# Patient Record
Sex: MALE | Race: WHITE | Employment: FULL TIME
[De-identification: names, ages, dates, MRNs, and addresses within clinical notes are randomized per-mention and may not be internally consistent; named-entity substitution may affect disease eponyms.]

---

## 2022-09-22 ENCOUNTER — NURSE TRIAGE (OUTPATIENT)
Dept: OTHER | Facility: CLINIC | Age: 32
End: 2022-09-22

## 2022-09-22 ENCOUNTER — OFFICE VISIT (OUTPATIENT)
Dept: FAMILY MEDICINE CLINIC | Age: 32
End: 2022-09-22
Payer: COMMERCIAL

## 2022-09-22 VITALS
OXYGEN SATURATION: 97 % | HEIGHT: 69 IN | BODY MASS INDEX: 36.38 KG/M2 | HEART RATE: 106 BPM | RESPIRATION RATE: 20 BRPM | WEIGHT: 245.6 LBS | DIASTOLIC BLOOD PRESSURE: 112 MMHG | SYSTOLIC BLOOD PRESSURE: 158 MMHG | TEMPERATURE: 98.6 F

## 2022-09-22 DIAGNOSIS — R06.02 SOB (SHORTNESS OF BREATH): Primary | ICD-10-CM

## 2022-09-22 DIAGNOSIS — I10 PRIMARY HYPERTENSION: ICD-10-CM

## 2022-09-22 DIAGNOSIS — M25.561 CHRONIC PAIN OF RIGHT KNEE: ICD-10-CM

## 2022-09-22 DIAGNOSIS — Z86.711 HISTORY OF PULMONARY EMBOLISM: ICD-10-CM

## 2022-09-22 DIAGNOSIS — F11.91 HISTORY OF HEROIN USE: ICD-10-CM

## 2022-09-22 DIAGNOSIS — G89.29 CHRONIC PAIN OF RIGHT KNEE: ICD-10-CM

## 2022-09-22 PROBLEM — I26.99 PULMONARY EMBOLISM (HCC): Status: ACTIVE | Noted: 2018-01-25

## 2022-09-22 PROBLEM — I26.99 PULMONARY EMBOLISM (HCC): Status: RESOLVED | Noted: 2018-01-25 | Resolved: 2022-09-22

## 2022-09-22 PROCEDURE — 99204 OFFICE O/P NEW MOD 45 MIN: CPT | Performed by: FAMILY MEDICINE

## 2022-09-22 PROCEDURE — 93000 ELECTROCARDIOGRAM COMPLETE: CPT | Performed by: FAMILY MEDICINE

## 2022-09-22 RX ORDER — AMLODIPINE BESYLATE 5 MG/1
TABLET ORAL
COMMUNITY
Start: 2022-06-23

## 2022-09-22 RX ORDER — ALBUTEROL SULFATE 90 UG/1
2 AEROSOL, METERED RESPIRATORY (INHALATION) 4 TIMES DAILY PRN
Qty: 18 G | Refills: 0 | Status: SHIPPED | OUTPATIENT
Start: 2022-09-22 | End: 2022-10-18 | Stop reason: ALTCHOICE

## 2022-09-22 SDOH — ECONOMIC STABILITY: FOOD INSECURITY: WITHIN THE PAST 12 MONTHS, YOU WORRIED THAT YOUR FOOD WOULD RUN OUT BEFORE YOU GOT MONEY TO BUY MORE.: NEVER TRUE

## 2022-09-22 SDOH — ECONOMIC STABILITY: FOOD INSECURITY: WITHIN THE PAST 12 MONTHS, THE FOOD YOU BOUGHT JUST DIDN'T LAST AND YOU DIDN'T HAVE MONEY TO GET MORE.: NEVER TRUE

## 2022-09-22 SDOH — ECONOMIC STABILITY: TRANSPORTATION INSECURITY
IN THE PAST 12 MONTHS, HAS LACK OF TRANSPORTATION KEPT YOU FROM MEETINGS, WORK, OR FROM GETTING THINGS NEEDED FOR DAILY LIVING?: NO

## 2022-09-22 SDOH — ECONOMIC STABILITY: TRANSPORTATION INSECURITY
IN THE PAST 12 MONTHS, HAS THE LACK OF TRANSPORTATION KEPT YOU FROM MEDICAL APPOINTMENTS OR FROM GETTING MEDICATIONS?: NO

## 2022-09-22 ASSESSMENT — PATIENT HEALTH QUESTIONNAIRE - PHQ9
SUM OF ALL RESPONSES TO PHQ QUESTIONS 1-9: 0
3. TROUBLE FALLING OR STAYING ASLEEP: 0
SUM OF ALL RESPONSES TO PHQ9 QUESTIONS 1 & 2: 0
9. THOUGHTS THAT YOU WOULD BE BETTER OFF DEAD, OR OF HURTING YOURSELF: 0
2. FEELING DOWN, DEPRESSED OR HOPELESS: 0
7. TROUBLE CONCENTRATING ON THINGS, SUCH AS READING THE NEWSPAPER OR WATCHING TELEVISION: 0
1. LITTLE INTEREST OR PLEASURE IN DOING THINGS: 0
10. IF YOU CHECKED OFF ANY PROBLEMS, HOW DIFFICULT HAVE THESE PROBLEMS MADE IT FOR YOU TO DO YOUR WORK, TAKE CARE OF THINGS AT HOME, OR GET ALONG WITH OTHER PEOPLE: 0
4. FEELING TIRED OR HAVING LITTLE ENERGY: 0
SUM OF ALL RESPONSES TO PHQ QUESTIONS 1-9: 0
8. MOVING OR SPEAKING SO SLOWLY THAT OTHER PEOPLE COULD HAVE NOTICED. OR THE OPPOSITE, BEING SO FIGETY OR RESTLESS THAT YOU HAVE BEEN MOVING AROUND A LOT MORE THAN USUAL: 0
5. POOR APPETITE OR OVEREATING: 0
SUM OF ALL RESPONSES TO PHQ QUESTIONS 1-9: 0
SUM OF ALL RESPONSES TO PHQ QUESTIONS 1-9: 0
6. FEELING BAD ABOUT YOURSELF - OR THAT YOU ARE A FAILURE OR HAVE LET YOURSELF OR YOUR FAMILY DOWN: 0

## 2022-09-22 ASSESSMENT — SOCIAL DETERMINANTS OF HEALTH (SDOH): HOW HARD IS IT FOR YOU TO PAY FOR THE VERY BASICS LIKE FOOD, HOUSING, MEDICAL CARE, AND HEATING?: NOT HARD AT ALL

## 2022-09-22 NOTE — PROGRESS NOTES
Chief Complaint   Patient presents with    Established New Doctor    Shortness of Breath     Chest pain, SOB. Onset 6 months, but getting worse last month. Winded after walking up stairs    Knee Pain     Right knee pain       SUBJECTIVE:   Eugene Barrow is a 27 y.o. male presenting to establish care. HPI:   Shortness of breath x6mos that is getting worse. Can't walk up a flight of stairs without feeling like he's run a marathon  Bilateral PE 3yrs ago; unclear cause. This doesn't feel like that. He had pain with breathing at that time. Also had a leg blood clot and thinks he was told to be on blood thinners. Works with [employer] and is away from home during the week. Back at home on the weekends. Needs to be able to climb poles to get final certifications for work. He can't do this right now w/ the breathing difficulties    His paternal grandfather was on blood thinners. Doesn't know about his dad's medical history. Saw a blood specialist who recommended follow up and that he might need to be on blood thinners for life but he never followed up. Hx of heroin use, sober for 2 years. He has a girlfriend and he's gotten back to being able to see his kids. Hx of HTN. Never had heart problems; had an infection from IVDU but wasn't told it went to his heart; Took bp meds today. It's always high. He has same 90 day rx from 6/23. He has 30 pills left. He has hx of being up to 188/122 prior to meds. Rt knee- slipped on water and fell on his knee. 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 Driving is irritating. Doesn't give out or buckle on him. Hx of torn meniscus.         Patient Active Problem List   Diagnosis    ADHD (attention deficit hyperactivity disorder)    Chronic back pain    Anxiety    Poison ivy dermatitis    Sciatica of right side    Depression    Hematochezia    Bilious vomiting with nausea    History of heroin use    Chronic pain of right knee    History of pulmonary embolism    SOB (shortness of breath)     Past Medical History:   Diagnosis Date    Abscess of left arm     ADHD (attention deficit hyperactivity disorder)     dx 2009    Anxiety 8/3/2011    Bipolar disorder ([facility] 8/3/2011    Cellulitis     Cellulitis of left arm 8/23/2014    Chronic back pain     Colitis presumed infectious 10/6/2015    Depression 8/23/2014    Drug abuse ([facility]     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    Elbow dislocation     dislocation and fracture    Heroin abuse - using IV  8/23/2014    Heroin abuse - using IV  8/23/2014    MRSA infection 8/23/14    L ARM    Pulmonary embolism ([facility] 1/25/2018    Sciatica of right side 10/4/2012     Past Surgical History:   Procedure Laterality Date    ANKLE SURGERY      KNEE CARTILAGE SURGERY      left meniscus surgery 2003     Social History     Socioeconomic History    Marital status: Single     Spouse name: None    Number of children: None    Years of education: None    Highest education level: None   Tobacco Use    Smoking status: Light Smoker     Types: Cigarettes    Smokeless tobacco: Never   Substance and Sexual Activity    Alcohol use: Yes     Comment: social    Drug use: No    Sexual activity: Yes     Partners: Female     Social Determinants of Health     Financial Resource Strain: Low Risk     Difficulty of Paying Living Expenses: Not hard at all   Food Insecurity: No Food Insecurity    Worried About Running Out of Food in the Last Year: Never true    Ran Out of Food in the Last Year: Never true   Transportation Needs: No Transportation Needs    Lack of Transportation (Medical): No    Lack of Transportation (Non-Medical): No     Family History   Problem Relation Age of Onset    High Cholesterol Mother     Cancer Paternal Grandmother         breast cancer    Heart Failure Paternal Grandfather      Current Outpatient Medications   Medication Sig Dispense Refill    amLODIPine (NORVASC) 5 MG tablet TAKE 1 TABLET BY MOUTH EVERY DAY      albuterol sulfate HFA (VENTOLIN HFA) 108 (90 Base) MCG/ACT inhaler Inhale 2 puffs into the lungs 4 times daily as needed for Wheezing 18 g 0    QUEtiapine (SEROQUEL) 300 MG tablet Take 300 mg by mouth nightly        No current facility-administered medications for this visit. Patient has no known allergies. Health Maintenance   Topic Date Due    COVID-19 Vaccine (1) Never done    Varicella vaccine (1 of 2 - 2-dose childhood series) Never done    Pneumococcal 0-64 years Vaccine (1 - PCV) Never done    Flu vaccine (1) Never done    Depression Monitoring  09/22/2023    DTaP/Tdap/Td vaccine (2 - Td or Tdap) 01/07/2028    Hepatitis C screen  Completed    HIV screen  Completed    Hepatitis A vaccine  Aged Out    Hepatitis B vaccine  Aged Out    Hib vaccine  Aged Out    Meningococcal (ACWY) vaccine  Aged Out       Review of Systems:  General: No F/C/NS/fatigue/wt loss   Cardiovascular: No CP  Respiratory: No SOB  GI: No N/V/D/C/abd pain/blood in stool  Neuro: No HA/weakness  Psych: No depressed mood/anxiety  Musculoskeletal: No myalgias    OBJECTIVE:  BP (!) 158/112   Pulse (!) 106   Temp 98.6 °F (37 °C) (Oral)   Resp 20   Ht 5' 9\" (1.753 m)   Wt 245 lb 9.6 oz (111.4 kg)   SpO2 97%   BMI 36.27 kg/m²      Physical exam:  afebrile, vitals reviewed  Gen:  WD, WN, NAD, A&Ox3, pleasant  Eyes:  Sclerae clear  Neck:  Supple  Heart:  RRR, no murmur, rubs, gallops; pulse 86, pulse ox 98% on repeat  Lungs:  CTAB, no W/R/R  Abd:  soft, NT/ND  O:  afebrile, vitals reviewed  Gen:  WD, WN, NAD, A&Ox3, pleasant     Knee exam [x] Right [] Left: Inspection: No scars, abrasions, or discolorations of the skin overlying the knee. No visible swelling, effusions, or masses. Palpation: +ttp of medial meniscus and MCL. No tenderness to palpation over PES, patella, or Patellar facets. ROM: Full active ROM in flexion, extension at the knee. Full passive ROM.    Strength: 5/5 strength in flexion, extension at the knee  Neurovascular: 2/4 DP pulses bilaterally  Special Tests: Neg Apprehension, Neg patellar grind (Patellofemoral syndrome), Appropriate laxity on valgus stress (MCL), Appropriate laxity on Varus stress (LCL), Neg Lachman's (ACL), Neg Anterior Drawer (ACL), Neg Posterior Drawer (PCL), Neg Yen (posterior Menisci)       ASSESSMENT/PLAN:  1. SOB (shortness of breath)  New, uncontrolled. EKG wnl. STAT CT chest to eval for PE given hx of multiple VTE. MICHELE prn in case underlying pulm pathology. Will call w/ CT results  F/u next week for bp. Stage 2 HTN may  -     EKG 12 Lead  -     Basic Metabolic Panel; Future  -     albuterol sulfate HFA (VENTOLIN HFA) 108 (90 Base) MCG/ACT inhaler; Inhale 2 puffs into the lungs 4 times daily as needed for Wheezing, Disp-18 g, R-0Normal  -     CT CHEST PULMONARY EMBOLISM W CONTRAST; Future  2. History of heroin use  Est, stable. Encouraged continued sobriety. Will need HIV, HCV screening    3. History of pulmonary embolism  -     CT CHEST PULMONARY EMBOLISM W CONTRAST; Future    4. Chronic pain of right knee  New, uncontrolled. Possible meniscal tear  Recommend MRI Rt knee  -     MRI KNEE RIGHT WO CONTRAST; Future    5. Primary HTN  Chronic, uncontrolled. No chest pain. EKG without ST changes concerning for prior infarct. Will likely need dual therapy. Increase to norvasc 10mg daily for now. CT chest above. F/u next week      Return in about 1 week (around 9/29/2022) for blood pressure follow up. Electronically signed by Yahaira Graff MD on 9/22/2022 at 3:25 PM EDT    Please note, portions of this note were completed with a voice recognition program.  Although every effort was made to ensure the accuracy of this automated transcription, some errors in transcription may have occurred.

## 2022-09-23 ENCOUNTER — HOSPITAL ENCOUNTER (OUTPATIENT)
Dept: CT IMAGING | Age: 32
Discharge: HOME | End: 2022-09-23
Payer: COMMERCIAL

## 2022-09-23 DIAGNOSIS — Z86.711 HISTORY OF PULMONARY EMBOLISM: ICD-10-CM

## 2022-09-23 DIAGNOSIS — R06.02 SOB (SHORTNESS OF BREATH): ICD-10-CM

## 2022-09-23 PROCEDURE — 6360000004 HC RX CONTRAST MEDICATION: Performed by: FAMILY MEDICINE

## 2022-09-23 PROCEDURE — 71260 CT THORAX DX C+: CPT | Performed by: FAMILY MEDICINE

## 2022-09-23 RX ADMIN — IOPAMIDOL 75 ML: 755 INJECTION, SOLUTION INTRAVENOUS at 13:39

## 2022-09-26 PROBLEM — I10 PRIMARY HYPERTENSION: Status: ACTIVE | Noted: 2022-09-26

## 2022-09-27 ENCOUNTER — TELEMEDICINE (OUTPATIENT)
Dept: FAMILY MEDICINE CLINIC | Age: 32
End: 2022-09-27
Payer: COMMERCIAL

## 2022-09-27 DIAGNOSIS — R06.02 SOB (SHORTNESS OF BREATH): ICD-10-CM

## 2022-09-27 DIAGNOSIS — I10 PRIMARY HYPERTENSION: Primary | ICD-10-CM

## 2022-09-27 PROBLEM — K57.90 DIVERTICULOSIS: Status: ACTIVE | Noted: 2022-09-27

## 2022-09-27 PROBLEM — M50.30 DDD (DEGENERATIVE DISC DISEASE), CERVICAL: Status: ACTIVE | Noted: 2022-09-27

## 2022-09-27 PROBLEM — G43.909 MIGRAINE HEADACHE: Status: ACTIVE | Noted: 2022-09-27

## 2022-09-27 PROBLEM — K58.9 IRRITABLE BOWEL SYNDROME: Status: ACTIVE | Noted: 2022-09-27

## 2022-09-27 PROBLEM — N20.0 NEPHROLITHIASIS: Status: ACTIVE | Noted: 2022-09-27

## 2022-09-27 PROCEDURE — 99213 OFFICE O/P EST LOW 20 MIN: CPT | Performed by: FAMILY MEDICINE

## 2022-09-27 RX ORDER — VALSARTAN 80 MG/1
80 TABLET ORAL DAILY
Qty: 30 TABLET | Refills: 0 | Status: SHIPPED | OUTPATIENT
Start: 2022-09-27 | End: 2022-10-18 | Stop reason: ALTCHOICE

## 2022-09-27 NOTE — PROGRESS NOTES
Chief complaint: Results (Follow up on CT results ), Medication Check, and Other (Doxy. me text [phone])      SUBJECTIVE:  DARWIN Hou (:  1990) is a 27 y.o. male with a past medical history of DVT and PE who presents with a chief complaint of: f/u CT chest and blood pressure    CT chest neg for PE; Albuterol didn't seem to help but he didn't hold his breath when he did 2 puffs at once    HTN- 153/103 are numbers he's still seeing on norvasc 10mg per day. He's wondering if the amlodipine is causing the shortness of breath as he read this was a possible side effect. Review of Systems:  General: No F/C/NS/fatigue/wt loss   Cardiovascular: No CP  Respiratory: No SOB  GI: No N/V/D/C/abd pain/blood in stool  Neuro: No HA/weakness  Psych: No depressed mood/anxiety  Musculoskeletal: No myalgias    Past Medical History:   Diagnosis Date    Abscess of left arm     ADHD (attention deficit hyperactivity disorder)     dx 2009    Anxiety 8/3/2011    Bipolar disorder ([facility] 8/3/2011    Cellulitis     Cellulitis of left arm 2014    Chronic back pain     Colitis presumed infectious 10/6/2015    Depression 2014    Drug abuse ([facility]     Elbow arthritis 2011    Elbow dislocation     dislocation and fracture    Heroin abuse - using IV  2014    Heroin abuse - using IV  2014    MRSA infection 14    L ARM    Primary hypertension 2022    Pulmonary embolism ([facility] 2018    Sciatica of right side 10/4/2012     Current Outpatient Medications on File Prior to Visit   Medication Sig Dispense Refill    amLODIPine (NORVASC) 5 MG tablet TAKE 1 TABLET BY MOUTH EVERY DAY      albuterol sulfate HFA (VENTOLIN HFA) 108 (90 Base) MCG/ACT inhaler Inhale 2 puffs into the lungs 4 times daily as needed for Wheezing 18 g 0    QUEtiapine (SEROQUEL) 300 MG tablet Take 300 mg by mouth nightly        No current facility-administered medications on file prior to visit.        OBJECTIVE:  There were no vitals taken for this visit. Physical Exam  Constitutional:       General: Not in acute distress. Appearance: Normal appearance. Not ill-appearing. HENT:      Head: Normocephalic and atraumatic. Nose: Nose normal.   Pulmonary:      Effort: Pulmonary effort is normal. No respiratory distress. Neurological:      General: No focal deficit present. Mental Status: Alert. Psychiatric:         Mood and Affect: Mood normal.         Behavior: Behavior normal.    ASSESSMENT/PLAN:  1. Primary hypertension  Chronic, uncontrolled. Goal 130/80. Start valsartan 80mg daily. Go back down to amlodipine 5mg daily. Goal for combo med w/ both (exforge) if tolerated. It's possible that amlodipine is causing s/e of CHANDRA, but will need to rule out other things first.   Keep checking bp 1-2x/day  F/u in 1week  Labs to eval for hyperK as well as other causes of HTN  Pt agrees  -     valsartan (DIOVAN) 80 MG tablet; Take 1 tablet by mouth daily, Disp-30 tablet, R-0Normal  -     TSH with Reflex; Future  -     Comprehensive Metabolic Panel; Future  -     CBC with Auto Differential; Future  -     Urinalysis with Microscopic; Future  -     Microalbumin / Creatinine Urine Ratio; Future  -     Lipid Panel; Future    2. SOB (shortness of breath)  New, uncontrolled. CT PE neg. Also no other overt pulm pathology or cardiomegaly to explain CHANDRA. HTN plan above. Retry MICHELE with appropriate administration  Consider PFTs and/or referral to cards for stress testing if persistent despite adequate BP control    Return in about 1 week (around 10/4/2022) for blood pressure and breathing f/u. The patient was evaluated through a synchronous (real-time) audio-video encounter. The patient (or guardian if applicable) is aware that this is a billable service. Verbal consent to proceed has been obtained within the past 12 months.  The visit was conducted pursuant to the emergency declaration under the [facility] Act, [facility] waiver authority and the [employer] Act. Patient identification was verified, and a caregiver was present when appropriate. The patient was located in a state where the provider was credentialed to provide care. An electronic signature was used to authenticate this note.     Electronically signed by Keli Thornton MD on 9/27/2022 at 5:28 PM.

## 2022-10-15 ENCOUNTER — HOSPITAL ENCOUNTER (OUTPATIENT)
Dept: MRI IMAGING | Age: 32
Discharge: HOME | End: 2022-10-15
Payer: COMMERCIAL

## 2022-10-15 DIAGNOSIS — M25.561 CHRONIC PAIN OF RIGHT KNEE: ICD-10-CM

## 2022-10-15 DIAGNOSIS — G89.29 CHRONIC PAIN OF RIGHT KNEE: ICD-10-CM

## 2022-10-15 PROCEDURE — 73721 MRI JNT OF LWR EXTRE W/O DYE: CPT

## 2022-10-18 ENCOUNTER — TELEPHONE (OUTPATIENT)
Dept: FAMILY MEDICINE CLINIC | Age: 32
End: 2022-10-18

## 2022-10-18 ENCOUNTER — TELEMEDICINE (OUTPATIENT)
Dept: FAMILY MEDICINE CLINIC | Age: 32
End: 2022-10-18
Payer: COMMERCIAL

## 2022-10-18 DIAGNOSIS — G89.29 CHRONIC PAIN OF RIGHT KNEE: Primary | ICD-10-CM

## 2022-10-18 DIAGNOSIS — G47.09 OTHER INSOMNIA: ICD-10-CM

## 2022-10-18 DIAGNOSIS — I10 PRIMARY HYPERTENSION: ICD-10-CM

## 2022-10-18 DIAGNOSIS — R06.09 DYSPNEA ON EXERTION: ICD-10-CM

## 2022-10-18 DIAGNOSIS — M25.561 CHRONIC PAIN OF RIGHT KNEE: Primary | ICD-10-CM

## 2022-10-18 PROCEDURE — 99214 OFFICE O/P EST MOD 30 MIN: CPT | Performed by: FAMILY MEDICINE

## 2022-10-18 RX ORDER — VALSARTAN 160 MG/1
160 TABLET ORAL DAILY
Qty: 30 TABLET | Refills: 1 | Status: SHIPPED | OUTPATIENT
Start: 2022-10-18

## 2022-10-18 RX ORDER — QUETIAPINE FUMARATE 300 MG/1
300 TABLET, FILM COATED ORAL NIGHTLY
Qty: 90 TABLET | Refills: 0 | Status: SHIPPED | OUTPATIENT
Start: 2022-10-18

## 2022-10-18 NOTE — TELEPHONE ENCOUNTER
----- Message from Rafita Galvin sent at 10/18/2022 12:28 PM EDT -----  Subject: Referral Request    Reason for referral request? Pt requesting referral to [address] [place] [address] PT. Provider patient wants to be referred to(if known):     Provider Phone Number(if known):     Additional Information for Provider?   ---------------------------------------------------------------------------  --------------  [facility]    [phone]; OK to leave message on voicemail  ---------------------------------------------------------------------------  --------------

## 2022-10-18 NOTE — PROGRESS NOTES
Chief complaint: Medication Check and Results (MRI results )      SUBJECTIVE:  DARIWN Steven (:  1990) is a 27 y.o. male with a past medical history of HTN who presents with a chief complaint of: f/u MRI of Rt Knee and HTN. Reviewed Rt knee MRI. He has intermittent but chronic right knee pain. 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 Is hoping it will just go away. Works from [address] [place] [address] [address] [place] [address],[address]  Is in the [place], [place] area, 175 mi away. There is PT at [facility] physical therapy in [place], [place]  Address: [address], [address]  Phone: [phone]    [facility] therapy     Lying down on left side. Has pain in his heart with breathing. This has occurred twice at night in the last week. It felt like the time when he had a blood clot. Also, . He was lying down watching videos. He had a CT PE recently that was negative but this pain started after the CT scan. However, the last time he had a PE this pain occurred with deep breaths any time he took a breath. No chest pain currently. No difficulty breathing or doing his job. HTN- 'its better. ' 134/87 on cuff that he can check. He's on valsartan 80mg and norvasc 5mg. Still having CHANDRA. Some days are bad, some days aren't. Needs refill of seroquel. Family doctor prescribed it. He takes it for sleep. Pt reports doing a test on paper and doctor said he had bipolar disorder. It is the only way he can sleep. More difficult to sleep with waking through the night if he doesn't take. He had been experimenting with other drugs at that time.      Review of Systems:  General: No F/C/NS/fatigue/wt loss   Cardiovascular: No CP  Respiratory: No SOB  GI: No N/V/D/C/abd pain/blood in stool  Neuro: No HA/weakness  Psych: No depressed mood/anxiety  Musculoskeletal: No myalgias    Patient Active Problem List   Diagnosis    ADHD (attention deficit hyperactivity disorder)    Chronic back pain    Anxiety Sciatica of right side    Depression    History of heroin use    Chronic pain of right knee    History of pulmonary embolism    Primary hypertension    Irritable bowel syndrome    Migraine headache    Diverticulosis    Nephrolithiasis    DDD (degenerative disc disease), cervical    Dyspnea on exertion    Other insomnia     Past Medical History:   Diagnosis Date    Abscess of left arm     ADHD (attention deficit hyperactivity disorder)     dx 2009    Anxiety 8/3/2011    Bilious vomiting with nausea 10/6/2015    Bipolar disorder ([facility] 8/3/2011    Cellulitis     Cellulitis of left arm 8/23/2014    Chronic back pain     Colitis presumed infectious 10/6/2015    DDD (degenerative disc disease), cervical 9/27/2022    Depression 8/23/2014    Drug abuse ([facility]     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    Elbow dislocation     dislocation and fracture    Hematochezia 10/10/2015    Heroin abuse - using IV  8/23/2014    Heroin abuse - using IV  8/23/2014    Irritable bowel syndrome     MRSA infection 8/23/14    L ARM    Nephrolithiasis 9/27/2022    Poison ivy dermatitis 6/12/2012    Primary hypertension 9/26/2022    Pulmonary embolism ([facility] 1/25/2018    Sciatica of right side 10/4/2012     Current Outpatient Medications on File Prior to Visit   Medication Sig Dispense Refill    amLODIPine (NORVASC) 5 MG tablet TAKE 1 TABLET BY MOUTH EVERY DAY      albuterol sulfate HFA (VENTOLIN HFA) 108 (90 Base) MCG/ACT inhaler Inhale 2 puffs into the lungs 4 times daily as needed for Wheezing (Patient not taking: Reported on 10/18/2022) 18 g 0     No current facility-administered medications on file prior to visit. OBJECTIVE:  There were no vitals taken for this visit. Physical Exam  Constitutional:       General: Not in acute distress. Appearance: Normal appearance. Not ill-appearing. HENT:      Head: Normocephalic and atraumatic. Nose: Nose normal.   Pulmonary:      Effort: Pulmonary effort is normal. No respiratory distress. Neurological:      General: No focal deficit present. Mental Status: Alert. Psychiatric:         Mood and Affect: Mood normal.         Behavior: Behavior normal.    ASSESSMENT/PLAN:  1. Chronic pain of right knee  Chronic, uncontrolled. Reviewed MRI. Nothing surgical. Recommend physical therapy. Pt will look some up in [place] and let me know which one works for his work schedule. 2. Primary hypertension  Chronic, uncontrolled. Better but not at goal. Goal <130/80. Increased diovan to 160mg daily. Continue norvasc 5mg daily  F/u in 1mos with response  -     valsartan (DIOVAN) 160 MG tablet; Take 1 tablet by mouth daily, Disp-30 tablet, R-1Normal    3. Dyspnea on exertion  Chronic, uncontrolled. PFTs to eval  F/u after completed  -     Full PFT Study With Bronchodilator; Future    4. Other insomnia  Chronic, controlled on seroquel 300mg daily. On mood stabilizer dosing. Reported hx of bipolar disorder. He is stable so will continue. Recommend labs to eval for metabolic derangements. Ordered at last apt to work up HTN. Pt agrees  -     QUEtiapine (SEROQUEL) 300 MG tablet; Take 1 tablet by mouth nightly, Disp-90 tablet, R-0Normal    Return in about 2 months (around 12/18/2022). Electronically signed by Roes Cabezas MD on 10/18/2022 at 12:27 PM.     Please note, portions of this note were completed with a voice recognition program.  Although every effort was made to ensure the accuracy of this automated transcription, some errors in transcription may have occurred.

## 2022-10-27 ENCOUNTER — TELEPHONE (OUTPATIENT)
Dept: FAMILY MEDICINE CLINIC | Age: 32
End: 2022-10-27

## 2022-10-27 NOTE — TELEPHONE ENCOUNTER
[employer], from [facility], is requesting a copy of MRI (Right Knee) results and PT order be faxed to her. Faxing and scanning into .

## 2022-11-18 ENCOUNTER — TELEPHONE (OUTPATIENT)
Dept: FAMILY MEDICINE CLINIC | Age: 32
End: 2022-11-18

## 2022-11-18 NOTE — TELEPHONE ENCOUNTER
----- Message from Jericho sent at 11/18/2022  1:06 PM EST -----  Subject: Message to Provider    QUESTIONS  Information for Provider? pt states that he did not make appt for 11/18 to   f/u for the MRI, he does not want to be charged for missing the appt.   ---------------------------------------------------------------------------  --------------  Clement Rodriguez INFO  [phone]; OK to leave message on voicemail  ---------------------------------------------------------------------------  --------------  SCRIPT ANSWERS  Relationship to Patient?  Self

## 2022-11-21 NOTE — TELEPHONE ENCOUNTER
Called and spoke with patient. I informed him that there was no appointment on 11/18/2022 with Dr. Myles Francisco scheduled for him, he had an appointment on 10/18/2022 with Dr. Myles Francisco. Patient stated that he received and e-mail and he believes that it was in his my chart as well stating that his missed appointment. Patient has no questions at this time.

## 2023-02-07 ENCOUNTER — TELEMEDICINE (OUTPATIENT)
Dept: FAMILY MEDICINE CLINIC | Age: 33
End: 2023-02-07
Payer: COMMERCIAL

## 2023-02-07 DIAGNOSIS — Z78.9 UNKNOWN VARICELLA VACCINATION STATUS: ICD-10-CM

## 2023-02-07 DIAGNOSIS — I10 PRIMARY HYPERTENSION: Primary | ICD-10-CM

## 2023-02-07 DIAGNOSIS — E66.01 CLASS 2 SEVERE OBESITY WITH SERIOUS COMORBIDITY AND BODY MASS INDEX (BMI) OF 36.0 TO 36.9 IN ADULT, UNSPECIFIED OBESITY TYPE (HCC): ICD-10-CM

## 2023-02-07 DIAGNOSIS — G47.09 OTHER INSOMNIA: ICD-10-CM

## 2023-02-07 DIAGNOSIS — G89.29 CHRONIC PAIN OF RIGHT KNEE: ICD-10-CM

## 2023-02-07 DIAGNOSIS — M25.561 CHRONIC PAIN OF RIGHT KNEE: ICD-10-CM

## 2023-02-07 PROCEDURE — 99214 OFFICE O/P EST MOD 30 MIN: CPT | Performed by: FAMILY MEDICINE

## 2023-02-07 RX ORDER — QUETIAPINE FUMARATE 300 MG/1
300 TABLET, FILM COATED ORAL NIGHTLY
Qty: 90 TABLET | Refills: 3 | Status: SHIPPED | OUTPATIENT
Start: 2023-02-07

## 2023-02-07 RX ORDER — AMLODIPINE BESYLATE 5 MG/1
TABLET ORAL
Qty: 90 TABLET | Refills: 3 | Status: SHIPPED | OUTPATIENT
Start: 2023-02-07

## 2023-02-07 RX ORDER — VALSARTAN 160 MG/1
160 TABLET ORAL DAILY
Qty: 90 TABLET | Refills: 3 | Status: SHIPPED | OUTPATIENT
Start: 2023-02-07

## 2023-02-07 NOTE — PROGRESS NOTES
Chief complaint: Medication Check and Other (Doxy. me text [phone] )      SUBJECTIVE:  HPI  Lima Rogers (:  1990) is a 27 y.o. male with a past medical history of HTN who presents with a chief complaint of: f/u HTN. HTN- 'its better.' [id number]. He's on valsartan 80mg and norvasc 5mg. Rt knee pain- couldn't find the time to do PT. He did crossfit and it's been better. Ortho surgeon said there was nothing worthy of surgery. Laid off from work- 'it's ok, it's the nature of the job.' Has time to do PT right now. Would like to do it here.      Review of Systems:  General: No F/C/NS/fatigue/wt loss   Cardiovascular: No CP  Respiratory: No SOB  GI: No N/V/D/C/abd pain/blood in stool  Neuro: No HA/weakness  Psych: No depressed mood/anxiety  Musculoskeletal: No myalgias    Patient Active Problem List   Diagnosis    ADHD (attention deficit hyperactivity disorder)    Chronic back pain    Anxiety    Sciatica of right side    Depression    History of heroin use    Chronic pain of right knee    History of pulmonary embolism    Primary hypertension    Irritable bowel syndrome    Migraine headache    Diverticulosis    Nephrolithiasis    DDD (degenerative disc disease), cervical    Dyspnea on exertion    Other insomnia     Past Medical History:   Diagnosis Date    Abscess of left arm     ADHD (attention deficit hyperactivity disorder)     dx 2009    Anxiety 8/3/2011    Bilious vomiting with nausea 10/6/2015    Bipolar disorder ([address] 8/3/2011    Cellulitis     Cellulitis of left arm 2014    Chronic back pain     Colitis presumed infectious 10/6/2015    DDD (degenerative disc disease), cervical 2022    Depression 2014    Drug abuse ([address]     Elbow arthritis 2011    Elbow dislocation     dislocation and fracture    Hematochezia 10/10/2015    Heroin abuse - using IV  2014    Heroin abuse - using IV  2014    Irritable bowel syndrome     MRSA infection 14    L ARM Nephrolithiasis 9/27/2022    Poison ivy dermatitis 6/12/2012    Primary hypertension 9/26/2022    Pulmonary embolism ([facility] [address] 1/25/2018    Sciatica of right side 10/4/2012     Current Outpatient Medications on File Prior to Visit   Medication Sig Dispense Refill    QUEtiapine (SEROQUEL) 300 MG tablet Take 1 tablet by mouth nightly 90 tablet 0    valsartan (DIOVAN) 160 MG tablet Take 1 tablet by mouth daily 30 tablet 1    amLODIPine (NORVASC) 5 MG tablet TAKE 1 TABLET BY MOUTH EVERY DAY       No current facility-administered medications on file prior to visit. OBJECTIVE:  There were no vitals taken for this visit. Physical Exam  Constitutional:       General: Not in acute distress. Appearance: Normal appearance. Not ill-appearing. HENT:      Head: Normocephalic and atraumatic. Nose: Nose normal.   Pulmonary:      Effort: Pulmonary effort is normal. No respiratory distress. Neurological:      General: No focal deficit present. Mental Status: Alert. Psychiatric:         Mood and Affect: Mood normal.         Behavior: Behavior normal.    ASSESSMENT/PLAN:  1. Primary hypertension  Est, stable. Continue current therapy. Labs to eval for end organ damage. Will call w/ abn labs. -     valsartan (DIOVAN) 160 MG tablet; Take 1 tablet by mouth daily, Disp-90 tablet, R-3Normal  -     amLODIPine (NORVASC) 5 MG tablet; TAKE 1 TABLET BY MOUTH EVERY DAY, Disp-90 tablet, R-3Normal  -     TSH with Reflex; Future  -     Urinalysis with Microscopic; Future  -     CBC; Future  -     Comprehensive Metabolic Panel; Future  -     Microalbumin / Creatinine Urine Ratio; Future  -     Lipid Panel; Future  -     Hemoglobin A1C; Future    2. Other insomnia  Est, stable. Continue current therapy. -     QUEtiapine (SEROQUEL) 300 MG tablet; Take 1 tablet by mouth nightly, Disp-90 tablet, R-3Normal    3.  Class 2 severe obesity with serious comorbidity and body mass index (BMI) of 36.0 to 36.9 in adult, unspecified obesity type ([facility] [address]  -     Comprehensive Metabolic Panel; Future  -     Lipid Panel; Future  -     Hemoglobin A1C; Future    4. Unknown varicella vaccination status  -     Varicella Zoster Antibody, IgG; Future    5. Chronic pain of right knee  Chronic, uncontrolled but improved with crossfit. ?PFPS. Non surgical per ortho per pt. No records to review. Refer to PT here  -     Ambulatory referral to Physical Therapy    Return in about 1 year (around 2/7/2024) for annual exam.    The patient was evaluated through a synchronous (real-time) audio-video encounter. The patient (or guardian if applicable) is aware that this is a billable service. Verbal consent to proceed has been obtained within the past 12 months. The visit was conducted pursuant to the emergency declaration under the [address] [place] [address] authority and the [employer] and [employer] General Act. Patient identification was verified, and a caregiver was present when appropriate. The patient was located in a state where the provider was credentialed to provide care. An electronic signature was used to authenticate this note.     Electronically signed by Nazia Tomlin MD on 2/7/2023 at 3:47 PM.

## 2023-02-13 ENCOUNTER — TELEPHONE (OUTPATIENT)
Dept: FAMILY MEDICINE CLINIC | Age: 33
End: 2023-02-13

## 2023-02-13 NOTE — TELEPHONE ENCOUNTER
Patient requesting two scripts be refilled and sent to pharmacy. .. Methylcarbamol 500mg, taken 4x daily. Meloxicam 15mg, taken once daily. Stated these medications were prescribed by previous provider outside of [facility]. Pharmacy. ..   [facility] [id number] - [address]

## 2023-02-14 DIAGNOSIS — M54.9 CHRONIC BACK PAIN, UNSPECIFIED BACK LOCATION, UNSPECIFIED BACK PAIN LATERALITY: Primary | Chronic | ICD-10-CM

## 2023-02-14 DIAGNOSIS — G89.29 CHRONIC BACK PAIN, UNSPECIFIED BACK LOCATION, UNSPECIFIED BACK PAIN LATERALITY: Primary | Chronic | ICD-10-CM

## 2023-02-14 RX ORDER — MELOXICAM 15 MG/1
15 TABLET ORAL DAILY
Qty: 90 TABLET | Refills: 0 | Status: SHIPPED | OUTPATIENT
Start: 2023-02-14

## 2023-02-14 RX ORDER — METHOCARBAMOL 500 MG/1
500 TABLET, FILM COATED ORAL 4 TIMES DAILY
Qty: 40 TABLET | Refills: 0 | Status: SHIPPED | OUTPATIENT
Start: 2023-02-14 | End: 2023-02-24

## 2023-02-14 NOTE — TELEPHONE ENCOUNTER
I've never discussed these meds with patient. I'd like to talk with patient about these medicines further. Please schedule VV. Bridge provided for these meds. thanks

## 2023-02-14 NOTE — TELEPHONE ENCOUNTER
I'm happy to refill these. Has he ever tried  a lower dose of the meloxicam? If not, i'd recommend trying that.

## 2023-02-14 NOTE — TELEPHONE ENCOUNTER
Called and spoke with patient he stated that he tried a lower dose of the meloxicam and it does not work as well for him. Patient is also asking if the medication can be sent in for 90 days to the pharmacy.  Please advise

## 2024-04-18 ENCOUNTER — TELEMEDICINE (OUTPATIENT)
Dept: FAMILY MEDICINE CLINIC | Age: 34
End: 2024-04-18
Payer: COMMERCIAL

## 2024-04-18 DIAGNOSIS — I10 PRIMARY HYPERTENSION: ICD-10-CM

## 2024-04-18 DIAGNOSIS — L30.9 DERMATITIS: Primary | ICD-10-CM

## 2024-04-18 DIAGNOSIS — F10.91 ALCOHOL USE DISORDER IN REMISSION: ICD-10-CM

## 2024-04-18 PROCEDURE — 99214 OFFICE O/P EST MOD 30 MIN: CPT | Performed by: FAMILY MEDICINE

## 2024-04-18 RX ORDER — VALSARTAN 80 MG/1
80 TABLET ORAL DAILY
Qty: 90 TABLET | Refills: 3 | Status: SHIPPED | OUTPATIENT
Start: 2024-04-18

## 2024-04-18 RX ORDER — FLUCONAZOLE 150 MG/1
150 TABLET ORAL
Qty: 4 TABLET | Refills: 0 | Status: SHIPPED | OUTPATIENT
Start: 2024-04-18 | End: 2024-05-10

## 2024-04-18 RX ORDER — CLOBETASOL PROPIONATE 0.5 MG/G
OINTMENT TOPICAL
Qty: 60 G | Refills: 0 | Status: SHIPPED | OUTPATIENT
Start: 2024-04-18

## 2024-04-18 RX ORDER — AMLODIPINE BESYLATE 5 MG/1
TABLET ORAL
Qty: 90 TABLET | Refills: 3 | Status: SHIPPED | OUTPATIENT
Start: 2024-04-18

## 2024-04-18 RX ORDER — PREDNISONE 10 MG/1
TABLET ORAL
Qty: 30 TABLET | Refills: 0 | Status: SHIPPED | OUTPATIENT
Start: 2024-04-18 | End: 2024-04-28

## 2024-04-18 SDOH — ECONOMIC STABILITY: INCOME INSECURITY: HOW HARD IS IT FOR YOU TO PAY FOR THE VERY BASICS LIKE FOOD, HOUSING, MEDICAL CARE, AND HEATING?: NOT HARD AT ALL

## 2024-04-18 SDOH — ECONOMIC STABILITY: FOOD INSECURITY: WITHIN THE PAST 12 MONTHS, YOU WORRIED THAT YOUR FOOD WOULD RUN OUT BEFORE YOU GOT MONEY TO BUY MORE.: NEVER TRUE

## 2024-04-18 SDOH — ECONOMIC STABILITY: HOUSING INSECURITY
IN THE LAST 12 MONTHS, WAS THERE A TIME WHEN YOU DID NOT HAVE A STEADY PLACE TO SLEEP OR SLEPT IN A SHELTER (INCLUDING NOW)?: NO

## 2024-04-18 SDOH — ECONOMIC STABILITY: FOOD INSECURITY: WITHIN THE PAST 12 MONTHS, THE FOOD YOU BOUGHT JUST DIDN'T LAST AND YOU DIDN'T HAVE MONEY TO GET MORE.: NEVER TRUE

## 2024-04-18 ASSESSMENT — PATIENT HEALTH QUESTIONNAIRE - PHQ9
7. TROUBLE CONCENTRATING ON THINGS, SUCH AS READING THE NEWSPAPER OR WATCHING TELEVISION: NOT AT ALL
3. TROUBLE FALLING OR STAYING ASLEEP: NOT AT ALL
SUM OF ALL RESPONSES TO PHQ QUESTIONS 1-9: 0
8. MOVING OR SPEAKING SO SLOWLY THAT OTHER PEOPLE COULD HAVE NOTICED. OR THE OPPOSITE, BEING SO FIGETY OR RESTLESS THAT YOU HAVE BEEN MOVING AROUND A LOT MORE THAN USUAL: NOT AT ALL
SUM OF ALL RESPONSES TO PHQ QUESTIONS 1-9: 0
6. FEELING BAD ABOUT YOURSELF - OR THAT YOU ARE A FAILURE OR HAVE LET YOURSELF OR YOUR FAMILY DOWN: NOT AT ALL
SUM OF ALL RESPONSES TO PHQ QUESTIONS 1-9: 0
4. FEELING TIRED OR HAVING LITTLE ENERGY: NOT AT ALL
10. IF YOU CHECKED OFF ANY PROBLEMS, HOW DIFFICULT HAVE THESE PROBLEMS MADE IT FOR YOU TO DO YOUR WORK, TAKE CARE OF THINGS AT HOME, OR GET ALONG WITH OTHER PEOPLE: NOT DIFFICULT AT ALL
SUM OF ALL RESPONSES TO PHQ QUESTIONS 1-9: 0
1. LITTLE INTEREST OR PLEASURE IN DOING THINGS: NOT AT ALL
9. THOUGHTS THAT YOU WOULD BE BETTER OFF DEAD, OR OF HURTING YOURSELF: NOT AT ALL
2. FEELING DOWN, DEPRESSED OR HOPELESS: NOT AT ALL
SUM OF ALL RESPONSES TO PHQ9 QUESTIONS 1 & 2: 0
5. POOR APPETITE OR OVEREATING: NOT AT ALL

## 2024-04-18 NOTE — PROGRESS NOTES
Chief complaint: Foot Problem (Possible right foot infection /[phone])      SUBJECTIVE:  HPI  Miguel Angel Villatoro (:  1990) is a 34 y.o. male with a past medical history of HTN who presents with a chief complaint of: Right foot problem- has come and gone for the last 2 years. He sent pictures yesterday. Called virtualist through work and said he needed to be seen urgently by PCP \"due to infection deeper than the skin\". Rash on foot that is destroying toenail #2. Rash is on medial aspect of Rt foot near MTP, medial aspect of to 2 involving the toenail and distal phalanx, also distal phalanx on toe #3 on medial aspect.  Very itchy. Has excoriations on top of foot d/t how itchy it is  Given cream yesterday by virtualist- helps a little w/ itchiness (rickey  Looks worse today than yesterday, however, he had boots on, so unsure if that affected it.  Erythematous patch on dorsum of foot  Tips of toes feel weird. Not tingly. Not painful. When he touches it, not warmer than the other toes.     HTN- not taking meds. Wanted to lose weight instead. He's lost some weight. Thinks he's 235lbs. Not having problems like before with high blood pressure- trouble breathing (big issue);  Not checking bp.  Breathing is not the best, not as bad as it used to be    Previously on valsartan 80mg and norvasc 5mg.     Recently drinking heavily. Hung it up a week ago. 10 shots a night. Wondering if that could have affected it.    Patient Active Problem List   Diagnosis    ADHD (attention deficit hyperactivity disorder)    Chronic back pain    Anxiety    Sciatica of right side    Depression    History of heroin use    Chronic pain of right knee    History of pulmonary embolism    Primary hypertension    Irritable bowel syndrome    Migraine headache    Diverticulosis    Nephrolithiasis    DDD (degenerative disc disease), cervical    Dyspnea on exertion    Other insomnia    Class 2 severe obesity with serious comorbidity and body mass

## 2024-05-14 ENCOUNTER — TELEMEDICINE (OUTPATIENT)
Dept: FAMILY MEDICINE CLINIC | Age: 34
End: 2024-05-14
Payer: COMMERCIAL

## 2024-05-14 DIAGNOSIS — B35.3 TINEA PEDIS OF RIGHT FOOT: Primary | ICD-10-CM

## 2024-05-14 DIAGNOSIS — L30.9 DERMATITIS: ICD-10-CM

## 2024-05-14 PROCEDURE — 99213 OFFICE O/P EST LOW 20 MIN: CPT | Performed by: FAMILY MEDICINE

## 2024-05-14 RX ORDER — FLUCONAZOLE 150 MG/1
150 TABLET ORAL
Qty: 4 TABLET | Refills: 0 | Status: SHIPPED | OUTPATIENT
Start: 2024-05-14 | End: 2024-06-05

## 2024-05-14 NOTE — PROGRESS NOTES
8/3/2011    Cellulitis     Cellulitis of left arm 8/23/2014    Chronic back pain     Class 2 severe obesity with serious comorbidity and body mass index (BMI) of 36.0 to 36.9 in adult ([facility]) 2/7/2023    Colitis presumed infectious 10/6/2015    DDD (degenerative disc disease), cervical 9/27/2022    Depression 8/23/2014    Drug abuse ([facility])     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    Elbow dislocation     dislocation and fracture    Hematochezia 10/10/2015    Heroin abuse - using IV  8/23/2014    Heroin abuse - using IV  8/23/2014    Irritable bowel syndrome     MRSA infection 8/23/14    L ARM    Nephrolithiasis 9/27/2022    Poison ivy dermatitis 6/12/2012    Primary hypertension 9/26/2022    Pulmonary embolism ([facility]) 1/25/2018    Sciatica of right side 10/4/2012     Current Outpatient Medications on File Prior to Visit   Medication Sig Dispense Refill    clobetasol (TEMOVATE) 0.05 % ointment Apply topically 2 times daily. 60 g 0    amLODIPine (NORVASC) 5 MG tablet TAKE 1 TABLET BY MOUTH EVERY DAY 90 tablet 3    valsartan (DIOVAN) 80 MG tablet Take 1 tablet by mouth daily 90 tablet 3     No current facility-administered medications on file prior to visit.       OBJECTIVE:  There were no vitals taken for this visit.     Physical Exam  Constitutional:       General: Not in acute distress.     Appearance: Normal appearance. Not ill-appearing.   HENT:      Head: Normocephalic and atraumatic.      Nose: Nose normal.   Pulmonary:      Effort: Pulmonary effort is normal. No respiratory distress.   Neurological:      General: No focal deficit present.      Mental Status: Alert.   Psychiatric:         Mood and Affect: Mood normal.         Behavior: Behavior normal.    ASSESSMENT/PLAN:  1. Tinea pedis of right foot  New. Improved w/ prednisone taper but lesion on inner Rt foot is still there. Approximately size of a quarter. Erythematous border, central desquamation and peeling as if 2 blisters in the middle. Pt states border is